# Patient Record
Sex: MALE
[De-identification: names, ages, dates, MRNs, and addresses within clinical notes are randomized per-mention and may not be internally consistent; named-entity substitution may affect disease eponyms.]

---
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
## 2022-01-28 ENCOUNTER — EMERGENCY (EMERGENCY)
Facility: HOSPITAL | Age: 22
LOS: 1 days | Discharge: HOME | End: 2022-01-28
Attending: EMERGENCY MEDICINE
Payer: COMMERCIAL

## 2022-01-28 VITALS
DIASTOLIC BLOOD PRESSURE: 78 MMHG | HEIGHT: 71 IN | WEIGHT: 154.98 LBS | OXYGEN SATURATION: 100 % | RESPIRATION RATE: 16 BRPM | HEART RATE: 82 BPM | SYSTOLIC BLOOD PRESSURE: 116 MMHG | TEMPERATURE: 98 F

## 2022-01-28 PROCEDURE — 99284 EMERGENCY DEPT VISIT MOD MDM: CPT | Mod: 25

## 2022-01-28 PROCEDURE — 76705 ECHO EXAM OF ABDOMEN: CPT | Mod: 26,59

## 2022-01-28 PROCEDURE — 76705 ECHO EXAM OF ABDOMEN: CPT

## 2022-01-28 PROCEDURE — 99284 EMERGENCY DEPT VISIT MOD MDM: CPT

## 2022-01-28 RX ORDER — CEPHALEXIN 500 MG
1 CAPSULE ORAL
Qty: 21 | Refills: 0
Start: 2022-01-28 | End: 2022-02-03

## 2022-01-28 RX ORDER — CEPHALEXIN 500 MG
500 CAPSULE ORAL ONCE
Refills: 0 | Status: COMPLETED | OUTPATIENT
Start: 2022-01-28 | End: 2022-01-28

## 2022-01-28 RX ADMIN — Medication 500 MILLIGRAM(S): at 02:01

## 2022-01-28 NOTE — ED PROCEDURE NOTE - ATTENDING CONTRIBUTION TO CARE
I was physically present for the E/M service provided. I was physically present for the key portions of the service provided. BREANNE.

## 2022-01-28 NOTE — ED PROVIDER NOTE - PROGRESS NOTE DETAILS
Osmin Hill MD. rx keflex, gen surg referral. advised for sitz baths. ED evaluation and management discussed with the patient and family (if available) in detail.  Close PMD follow up encouraged.  Strict ED return instructions discussed in detail and patient given the opportunity to ask any questions about their discharge diagnosis and instructions. Patient verbalized understanding.

## 2022-01-28 NOTE — ED PROVIDER NOTE - NS ED ROS FT
Constitutional: no fevers; no chills  HEENT: no visual changes, no sore throat, no rhinorrhea  CV: no cp; no palpitations  Resp: no sob; no cough  GI: no abd pain, no nausea, no vomiting, no diarrhea, no constipation; gluteal cleft pain and drainage  : no dysuria, no hematuria  MSK: no myalgias; no arthralgias  skin: no rashes  neuro: no HA, no numbness; no weakness, no tingling  endo: no polyuria, no polydipsia

## 2022-01-28 NOTE — ED PROVIDER NOTE - NSFOLLOWUPCLINICS_GEN_ALL_ED_FT
[facility]  General Surgery  [address] [place] [address] - [address] [place] [address]  Phone: [phone]  Fax:

## 2022-01-28 NOTE — ED PROVIDER NOTE - OBJECTIVE STATEMENT
20 yo M no significant pmhx presents for gluteal pain x2 days and noted bloody drainage on his underwear today, prompting his visit. He was recently on a long flight and was seated for a long time. He denies rectal/anal pain. He denies abd pain, fever, dysuria, n/v/d, anal receptive sex, or any type of foreign body into the anus/rectum.

## 2022-01-28 NOTE — ED PROVIDER NOTE - ATTENDING CONTRIBUTION TO CARE
Afebrile. Awake and Alert. CN II-XII grossly intact. Moves all extremities without lateralization. +Erythematous firm mass 1.5 cm in upper gluteal cleft with mild surrounding erythema, draining.    Infected pilonidal cyst draining  Abx, f/u gen surg for excision

## 2022-01-28 NOTE — ED PROVIDER NOTE - PHYSICAL EXAMINATION
PHYSICAL EXAM:  GENERAL: non-toxic appearing; in no respiratory distress  HEAD Atraumatic, Normocephalic  NECK: No JVD; trachea midline  EYES: PERRL, EOMs intact b/l w/out deficits; normal conjunctiva  CHEST/LUNG: CTAB no wheezes/rhonchi/rales  HEART: RRR no murmur/gallops/rubs  ABDOMEN: +BS, soft, NT, ND  RECTAL: above the gluteal cleft, there is an area of erythema, purulent drainage, and mild induration b/l (L>R) consistent with a pilonidal cyst; rectal exam: no fissures, hemorrhoids, or perianal tenderness/fluctuance  EXTREMITIES: No LE edema, +2 radial pulses b/l, +2 DP/PT pulses b/l  MUSCULOSKELETAL: FROM of all 4 extremities  NERVOUS SYSTEM:  A&Ox3, No motor deficits or sensory deficits; CNII-XII intact; Speech is fluent and appropriate  SKIN:  Warm and dry as visualized

## 2022-01-28 NOTE — ED PROVIDER NOTE - CLINICAL SUMMARY MEDICAL DECISION MAKING FREE TEXT BOX
Osmin Hill MD. pt presents for gluteal cleft pain /drainage, found to have a pilonidal cyst/abscess. no perianal tenderness or fluctuance. abd soft nt nd. the abscess is already spontaneously draining and thus, would not benefit from I&D at this time. rx sent for keflex. gen surg referral. otc analgesia

## 2022-01-28 NOTE — ED PROVIDER NOTE - PATIENT PORTAL LINK FT
You can access the FollowMyHealth Patient Portal offered by [facility] by registering at the following website: [email or web address]. By joining [facility]’s FollowMyHealth portal, you will also be able to view your health information using other applications (apps) compatible with our system.

## 2022-01-28 NOTE — ED PROVIDER NOTE - NSFOLLOWUPINSTRUCTIONS_ED_ALL_ED_FT
You were found to have a pilonidal abscess/cyst. It is already spontaneously draining.    For this, please take over the counter Tylenol and/or Motrin for pain.    Please take Keflex (antibiotic) which was prescribed to you for the abscess.    I recommend Sitz baths and warm compresses.    Please follow up with a general surgeon.     Please return to the emergency department for worsening of your symptoms.

## 2022-01-28 NOTE — ED ADULT NURSE NOTE - OBJECTIVE STATEMENT
21 year old male coming in complaining of rectal pain. Patient noted pain on his sacral/gluteale area for the past two days. He noted some bloody drainage in his underwear today. Patient has a perirectal abbess. The abbess had serosanguinous fluid drainage. No shortness of breath or difficulty breathing. No chest pain, pressure or palpitations. No abdominal pain, nausea or vomiting. No fever, chills, or body aches.

## 2022-08-22 ENCOUNTER — APPOINTMENT (OUTPATIENT)
Dept: ORTHOPEDIC SURGERY | Facility: CLINIC | Age: 22
End: 2022-08-22

## 2022-08-22 VITALS — WEIGHT: 165 LBS | HEIGHT: 70 IN | BODY MASS INDEX: 23.62 KG/M2

## 2022-08-22 DIAGNOSIS — S93.491A SPRAIN OF OTHER LIGAMENT OF RIGHT ANKLE, INITIAL ENCOUNTER: ICD-10-CM

## 2022-08-22 PROCEDURE — 99214 OFFICE O/P EST MOD 30 MIN: CPT

## 2022-08-22 PROCEDURE — 73610 X-RAY EXAM OF ANKLE: CPT | Mod: RT

## 2022-08-22 NOTE — ASSESSMENT
[FreeTextEntry1] : The patient was explained that they have an ankle sprain. Weight bearing in supportive footwear with compression sleeve was recommended.  Icing for 20 minutes 2-3 times per day was instructed. Physical therapy was prescribed, and home range of motion exercises were encouraged.\par

## 2022-08-22 NOTE — PHYSICAL EXAM
[5___] : [facility] 5[unfilled]/5 [2+] : posterior tibialis pulse: 2+ [Normal] : saphenous nerve sensation normal [4___] : eversion 4[unfilled]/5 [] : no pain when stressing lateral tarsal metatarsal joint [Right] : right ankle [Weight -] : weightbearing [There are no fractures, subluxations or dislocations. No significant abnormalities are seen] : There are no fractures, subluxations or dislocations. No significant abnormalities are seen [de-identified] : WB in CAM boot.  [de-identified] : plantar flexion 30 degrees [de-identified] : inversion 20 degrees [de-identified] : eversion 15 degrees [[id number]] : dorsiflexion 10 degrees

## 2022-08-22 NOTE — HISTORY OF PRESENT ILLNESS
[2] : 2 [0] : 0 [de-identified] : Pt. is a 22 year old male who presents for evaluation of his RT ankle. Reports twisting injury while playing basketball mid-July 2022 while in [place]. He presents today WBAT in [facility] boot. He reports symptoms are improving. H/O RT ankle fracture which healed well.  [FreeTextEntry1] : Right ankle  [FreeTextEntry5] : PT states he twisted his ankle a month ago.

## 2022-09-16 ENCOUNTER — APPOINTMENT (OUTPATIENT)
Dept: ORTHOPEDIC SURGERY | Facility: CLINIC | Age: 22
End: 2022-09-16
